# Patient Record
Sex: MALE | Race: WHITE | NOT HISPANIC OR LATINO | URBAN - METROPOLITAN AREA
[De-identification: names, ages, dates, MRNs, and addresses within clinical notes are randomized per-mention and may not be internally consistent; named-entity substitution may affect disease eponyms.]

---

## 2017-09-06 ENCOUNTER — OFFICE VISIT (OUTPATIENT)
Dept: MEDICAL GROUP | Facility: PHYSICIAN GROUP | Age: 47
End: 2017-09-06
Payer: OTHER GOVERNMENT

## 2017-09-06 VITALS
TEMPERATURE: 97.6 F | WEIGHT: 175 LBS | HEART RATE: 69 BPM | SYSTOLIC BLOOD PRESSURE: 100 MMHG | OXYGEN SATURATION: 96 % | RESPIRATION RATE: 16 BRPM | HEIGHT: 72 IN | DIASTOLIC BLOOD PRESSURE: 60 MMHG | BODY MASS INDEX: 23.7 KG/M2

## 2017-09-06 DIAGNOSIS — S46.019A ROTATOR CUFF STRAIN, UNSPECIFIED LATERALITY, INITIAL ENCOUNTER: ICD-10-CM

## 2017-09-06 DIAGNOSIS — Z87.898 HISTORY OF SNORING: ICD-10-CM

## 2017-09-06 PROCEDURE — 99214 OFFICE O/P EST MOD 30 MIN: CPT | Performed by: FAMILY MEDICINE

## 2017-09-06 RX ORDER — DOXYCYCLINE HYCLATE 50 MG/1
TABLET, DELAYED RELEASE ORAL
COMMUNITY

## 2017-09-06 RX ORDER — ETODOLAC 200 MG/1
200 CAPSULE ORAL 2 TIMES DAILY
Qty: 60 CAP | Refills: 3 | Status: SHIPPED | OUTPATIENT
Start: 2017-09-06

## 2017-09-06 ASSESSMENT — PATIENT HEALTH QUESTIONNAIRE - PHQ9: CLINICAL INTERPRETATION OF PHQ2 SCORE: 0

## 2017-09-06 ASSESSMENT — ENCOUNTER SYMPTOMS
CHILLS: 0
ARTHRALGIAS: 1
NECK PAIN: 0
CONSTITUTIONAL NEGATIVE: 1
PALPITATIONS: 0
GASTROINTESTINAL NEGATIVE: 1
COUGH: 0
EYES NEGATIVE: 1
CONSTIPATION: 0
HEADACHES: 0
CARDIOVASCULAR NEGATIVE: 1
FEVER: 0
PSYCHIATRIC NEGATIVE: 1
NEUROLOGICAL NEGATIVE: 1
HEMOPTYSIS: 0
DIZZINESS: 0
MYALGIAS: 0
RESPIRATORY NEGATIVE: 1

## 2017-09-06 NOTE — PROGRESS NOTES
Subjective:      Collin Benavidez is a 47 y.o. male who presents with Establish Care and Shoulder Pain            Bilateral shoulder pain since doing some pushups around a month ago. Pain is worse in the left > right  There are no diagnoses linked to this encounter.  No past medical history on file.  Past Surgical History:  No date: TESTICLE EXPLORATION      Comment: failure to decend  No date: TONSILLECTOMY  No date: TYMPANOPLASTY  No date: VASECTOMY  Smoking status: Former Smoker                                                              Packs/day: 0.00      Years: 0.00      Smokeless tobacco: Never Used                      Comment: quit mid 2015  Alcohol use: No               Comment: q 3 weeks    History reviewed.  No pertinent family history.      Current Outpatient Prescriptions: •  Doxycycline Hyclate 50 MG Tablet Delayed Response, Take  by mouth., Disp: , Rfl: •  tadalafil (CIALIS) 20 MG tablet, Take 1 Tab by mouth as needed for Erectile Dysfunction. (Patient not taking: Reported on 9/6/2017), Disp: 10 Tab, Rfl: 3        Shoulder Pain   This is a new problem. The current episode started more than 1 month ago. The problem occurs intermittently. The problem has been unchanged. Associated symptoms include arthralgias. Pertinent negatives include no chest pain, chills, coughing, fever, headaches, myalgias, neck pain or rash. The symptoms are aggravated by exertion. He has tried rest for the symptoms. The treatment provided mild relief.       Review of Systems   Constitutional: Negative.  Negative for chills and fever.        No past medical history on file.  Past Surgical History:  No date: TESTICLE EXPLORATION      Comment: failure to decend  No date: TONSILLECTOMY  No date: TYMPANOPLASTY  No date: VASECTOMY  Smoking status: Former Smoker                                                              Packs/day: 0.00      Years: 0.00      Smokeless tobacco: Never Used                      Comment: quit mid  2015  Alcohol use: No               Comment: q 3 weeks    History reviewed.  No pertinent family history.     HENT: Negative.    Eyes: Negative.    Respiratory: Negative.  Negative for cough and hemoptysis.    Cardiovascular: Negative.  Negative for chest pain and palpitations.   Gastrointestinal: Negative.  Negative for constipation.   Genitourinary: Negative.  Negative for dysuria and urgency.   Musculoskeletal: Positive for arthralgias and joint pain. Negative for myalgias and neck pain.   Skin: Negative.  Negative for rash.   Neurological: Negative.  Negative for dizziness and headaches.   Endo/Heme/Allergies: Negative.    Psychiatric/Behavioral: Negative.  Negative for suicidal ideas.          Objective:     /60   Pulse 69   Temp 36.4 °C (97.6 °F)   Resp 16   Ht 1.829 m (6')   Wt 79.4 kg (175 lb)   SpO2 96%   BMI 23.73 kg/m²      Physical Exam   Constitutional: He is oriented to person, place, and time. He appears well-developed and well-nourished. No distress.   HENT:   Head: Normocephalic and atraumatic.   Mouth/Throat: Oropharynx is clear and moist. No oropharyngeal exudate.   Eyes: Pupils are equal, round, and reactive to light.   Cardiovascular: Normal rate, regular rhythm, normal heart sounds and intact distal pulses.  Exam reveals no gallop and no friction rub.    No murmur heard.  Pulmonary/Chest: Effort normal and breath sounds normal. No respiratory distress. He has no wheezes. He has no rales. He exhibits no tenderness.   Musculoskeletal:        Right shoulder: He exhibits decreased range of motion and pain.        Left shoulder: He exhibits decreased range of motion, tenderness and pain.   Only with 90 degrees of flexion and abduction in both shoulders with tenderness along the supraspinatus insertion in both sides   Neurological: He is alert and oriented to person, place, and time.   Skin: He is not diaphoretic.   Psychiatric: He has a normal mood and affect. His behavior is normal.  Judgment and thought content normal.   Nursing note and vitals reviewed.              Assessment/Plan:     There are no diagnoses linked to this encounter.    Bilateral rotator cuff strain will treat with pt and nsaids

## 2017-10-19 ENCOUNTER — OFFICE VISIT (OUTPATIENT)
Dept: MEDICAL GROUP | Facility: PHYSICIAN GROUP | Age: 47
End: 2017-10-19
Payer: OTHER GOVERNMENT

## 2017-10-19 VITALS
SYSTOLIC BLOOD PRESSURE: 100 MMHG | OXYGEN SATURATION: 95 % | WEIGHT: 195 LBS | DIASTOLIC BLOOD PRESSURE: 60 MMHG | HEIGHT: 72 IN | HEART RATE: 73 BPM | BODY MASS INDEX: 26.41 KG/M2 | RESPIRATION RATE: 14 BRPM | TEMPERATURE: 97.9 F

## 2017-10-19 DIAGNOSIS — S46.019A STRAIN OF ROTATOR CUFF CAPSULE, UNSPECIFIED LATERALITY, INITIAL ENCOUNTER: ICD-10-CM

## 2017-10-19 DIAGNOSIS — M51.26 HNP (HERNIATED NUCLEUS PULPOSUS), LUMBAR: ICD-10-CM

## 2017-10-19 PROCEDURE — 99214 OFFICE O/P EST MOD 30 MIN: CPT | Performed by: FAMILY MEDICINE

## 2017-10-19 ASSESSMENT — ENCOUNTER SYMPTOMS
MYALGIAS: 1
BACK PAIN: 1
ABDOMINAL PAIN: 0

## 2017-10-19 NOTE — PROGRESS NOTES
Subjective:      Collin Benavidez is a 47 y.o. male who presents with Shoulder Pain            Had a fall and hit back MRI with L5 hnp    Did pt for both shoulder and minimally better but not fully improved, PT is advising that he gets MRI's of both shoulders    There are no diagnoses linked to this encounter.  No past medical history on file.  Past Surgical History:  No date: TESTICLE EXPLORATION      Comment: failure to decend  No date: TONSILLECTOMY  No date: TYMPANOPLASTY  No date: VASECTOMY  Smoking status: Former Smoker                                                              Packs/day: 0.00      Years: 0.00      Smokeless tobacco: Never Used                      Comment: quit mid 2015  Alcohol use: No               Comment: q 3 weeks    History reviewed.  No pertinent family history.      Current Outpatient Prescriptions: •  Doxycycline Hyclate 50 MG Tablet Delayed Response, Take  by mouth., Disp: , Rfl: •  etodolac (LODINE) 200 MG Cap, Take 1 Cap by mouth 2 times a day., Disp: 60 Cap, Rfl: 3•  tadalafil (CIALIS) 20 MG tablet, Take 1 Tab by mouth as needed for Erectile Dysfunction. (Patient not taking: Reported on 9/6/2017), Disp: 10 Tab, Rfl: 3    Patient was instructed on the use of medications, either prescriptions or OTC and informed on when the appropriate follow up time period should be. In addition, patient was also instructed that should any acute worsening occur that they should notify this clinic asap or call 911.          Back Pain   This is a new problem. The current episode started 1 to 4 weeks ago. The problem occurs constantly. The problem is unchanged. The pain is present in the lumbar spine. The quality of the pain is described as aching. The pain radiates to the right foot and left foot. The pain is moderate. The pain is worse during the day. The symptoms are aggravated by bending. Stiffness is present in the morning. Pertinent negatives include no abdominal pain or bladder incontinence.  Risk factors include recent trauma (fell and hit back and MRI with L5 hnp). He has tried NSAIDs for the symptoms. The treatment provided mild relief.       Review of Systems   Gastrointestinal: Negative for abdominal pain.   Genitourinary: Negative for bladder incontinence.   Musculoskeletal: Positive for back pain, joint pain and myalgias.          Objective:     /60   Pulse 73   Temp 36.6 °C (97.9 °F)   Resp 14   Ht 1.829 m (6')   Wt 88.5 kg (195 lb)   SpO2 95%   BMI 26.45 kg/m²      Physical Exam   Constitutional: He is oriented to person, place, and time. He appears well-developed and well-nourished. No distress.   HENT:   Head: Normocephalic and atraumatic.   Mouth/Throat: Oropharynx is clear and moist. No oropharyngeal exudate.   Eyes: Pupils are equal, round, and reactive to light.   Cardiovascular: Normal rate, regular rhythm, normal heart sounds and intact distal pulses.  Exam reveals no gallop and no friction rub.    No murmur heard.  Pulmonary/Chest: Effort normal and breath sounds normal. No respiratory distress. He has no wheezes. He has no rales. He exhibits no tenderness.   Musculoskeletal:        Right shoulder: He exhibits decreased range of motion and pain.        Left shoulder: He exhibits decreased range of motion and pain.        Lumbar back: He exhibits decreased range of motion and pain.   Neurological: He is alert and oriented to person, place, and time.   Skin: He is not diaphoretic.   Psychiatric: He has a normal mood and affect. His behavior is normal. Judgment and thought content normal.   Nursing note and vitals reviewed.              Assessment/Plan:     There are no diagnoses linked to this encounter.  Continued rotator cuff issues will get MRI of both shoulders and has f/u set up with ortho already for his hnp in the back

## 2017-11-03 ENCOUNTER — APPOINTMENT (OUTPATIENT)
Dept: RADIOLOGY | Facility: MEDICAL CENTER | Age: 47
End: 2017-11-03
Attending: FAMILY MEDICINE
Payer: COMMERCIAL

## 2017-11-03 DIAGNOSIS — S46.019A STRAIN OF ROTATOR CUFF CAPSULE, UNSPECIFIED LATERALITY, INITIAL ENCOUNTER: ICD-10-CM

## 2017-11-03 PROCEDURE — 73221 MRI JOINT UPR EXTREM W/O DYE: CPT | Mod: RT

## 2017-11-03 PROCEDURE — 73221 MRI JOINT UPR EXTREM W/O DYE: CPT | Mod: LT

## 2017-12-05 ENCOUNTER — OFFICE VISIT (OUTPATIENT)
Dept: MEDICAL GROUP | Facility: PHYSICIAN GROUP | Age: 47
End: 2017-12-05
Payer: OTHER GOVERNMENT

## 2017-12-05 VITALS
WEIGHT: 200 LBS | HEIGHT: 72 IN | BODY MASS INDEX: 27.09 KG/M2 | DIASTOLIC BLOOD PRESSURE: 60 MMHG | SYSTOLIC BLOOD PRESSURE: 92 MMHG | TEMPERATURE: 97.9 F | RESPIRATION RATE: 14 BRPM | HEART RATE: 70 BPM | OXYGEN SATURATION: 95 %

## 2017-12-05 DIAGNOSIS — J34.2 DEVIATED SEPTUM: ICD-10-CM

## 2017-12-05 DIAGNOSIS — S46.019A STRAIN OF ROTATOR CUFF CAPSULE, UNSPECIFIED LATERALITY, INITIAL ENCOUNTER: ICD-10-CM

## 2017-12-05 DIAGNOSIS — G47.34 IDIOPATHIC SLEEP RELATED NONOBSTRUCTIVE ALVEOLAR HYPOVENTILATION: ICD-10-CM

## 2017-12-05 PROCEDURE — 99214 OFFICE O/P EST MOD 30 MIN: CPT | Performed by: FAMILY MEDICINE

## 2017-12-05 ASSESSMENT — ENCOUNTER SYMPTOMS
PSYCHIATRIC NEGATIVE: 1
GASTROINTESTINAL NEGATIVE: 1
RESPIRATORY NEGATIVE: 1
NEUROLOGICAL NEGATIVE: 1
MUSCULOSKELETAL NEGATIVE: 1
CONSTIPATION: 0
CHILLS: 0
FEVER: 0
CONSTITUTIONAL NEGATIVE: 1
COUGH: 0
EYES NEGATIVE: 1
PALPITATIONS: 0
CARDIOVASCULAR NEGATIVE: 1
HEMOPTYSIS: 0
HEADACHES: 0
MYALGIAS: 0
DIZZINESS: 0
NECK PAIN: 0

## 2017-12-05 NOTE — PROGRESS NOTES
Subjective:      Collin Benavidez is a 47 y.o. male who presents with Pain            1. Strain of rotator cuff capsule, unspecified laterality, initial encounter   MRI of both shoulder with some downsloping of the ac hook but intact musculature, advised on RICE    2. Idiopathic sleep related nonobstructive alveolar hypoventilation  Sleep study with sleep apnea, has had tonsils out and discussed with ent other option including UPPP but will hold off on that now and try dental device and maybe cipap    3. Deviated septum  Will f/u with ent for possible surgery vs. Nose sprays    History reviewed. No pertinent past medical history.  Past Surgical History:  No date: TESTICLE EXPLORATION      Comment: failure to decend  No date: TONSILLECTOMY  No date: TYMPANOPLASTY  No date: VASECTOMY  Smoking status: Former Smoker                                                              Packs/day: 0.00      Years: 0.00      Smokeless tobacco: Never Used                      Comment: quit mid 2015  Alcohol use: No               Comment: q 3 weeks    History reviewed.  No pertinent family history.      Current Outpatient Prescriptions: •  Doxycycline Hyclate 50 MG Tablet Delayed Response, Take  by mouth., Disp: , Rfl: •  etodolac (LODINE) 200 MG Cap, Take 1 Cap by mouth 2 times a day., Disp: 60 Cap, Rfl: 3•  tadalafil (CIALIS) 20 MG tablet, Take 1 Tab by mouth as needed for Erectile Dysfunction. (Patient not taking: Reported on 9/6/2017), Disp: 10 Tab, Rfl: 3    Patient was instructed on the use of medications, either prescriptions or OTC and informed on when the appropriate follow up time period should be. In addition, patient was also instructed that should any acute worsening occur that they should notify this clinic asap or call 911.            Review of Systems   Constitutional: Negative.  Negative for chills and fever.        History reviewed. No pertinent past medical history.  Past Surgical History:  No date: TESTICLE  EXPLORATION      Comment: failure to decend  No date: TONSILLECTOMY  No date: TYMPANOPLASTY  No date: VASECTOMY  Smoking status: Former Smoker                                                              Packs/day: 0.00      Years: 0.00      Smokeless tobacco: Never Used                      Comment: quit mid 2015  Alcohol use: No               Comment: q 3 weeks    History reviewed.  No pertinent family history.     HENT: Negative.    Eyes: Negative.    Respiratory: Negative.  Negative for cough and hemoptysis.    Cardiovascular: Negative.  Negative for chest pain and palpitations.   Gastrointestinal: Negative.  Negative for constipation.   Genitourinary: Negative.  Negative for dysuria and urgency.   Musculoskeletal: Negative.  Negative for myalgias and neck pain.   Skin: Negative.  Negative for rash.   Neurological: Negative.  Negative for dizziness and headaches.   Endo/Heme/Allergies: Negative.    Psychiatric/Behavioral: Negative.  Negative for suicidal ideas.          Objective:     BP (!) 92/60   Pulse 70   Temp 36.6 °C (97.9 °F)   Resp 14   Ht 1.829 m (6')   Wt 90.7 kg (200 lb)   SpO2 95%   BMI 27.12 kg/m²      Physical Exam   Constitutional: He is oriented to person, place, and time. He appears well-developed and well-nourished. No distress.   HENT:   Head: Normocephalic and atraumatic.   Mouth/Throat: Oropharynx is clear and moist. No oropharyngeal exudate.   Eyes: Pupils are equal, round, and reactive to light.   Cardiovascular: Normal rate, regular rhythm, normal heart sounds and intact distal pulses.  Exam reveals no gallop and no friction rub.    No murmur heard.  Pulmonary/Chest: Effort normal and breath sounds normal. No respiratory distress. He has no wheezes. He has no rales. He exhibits no tenderness.   Neurological: He is alert and oriented to person, place, and time.   Skin: He is not diaphoretic.   Psychiatric: He has a normal mood and affect. His behavior is normal. Judgment and thought  content normal.   Nursing note and vitals reviewed.              Assessment/Plan:     1. Strain of rotator cuff capsule, unspecified laterality, initial encounter      2. Idiopathic sleep related nonobstructive alveolar hypoventilation      3. Deviated septum

## 2018-01-23 ENCOUNTER — OFFICE VISIT (OUTPATIENT)
Dept: MEDICAL GROUP | Facility: PHYSICIAN GROUP | Age: 48
End: 2018-01-23
Payer: OTHER GOVERNMENT

## 2018-01-23 VITALS
HEIGHT: 72 IN | WEIGHT: 196 LBS | SYSTOLIC BLOOD PRESSURE: 120 MMHG | DIASTOLIC BLOOD PRESSURE: 60 MMHG | OXYGEN SATURATION: 93 % | RESPIRATION RATE: 14 BRPM | BODY MASS INDEX: 26.55 KG/M2 | TEMPERATURE: 98 F | HEART RATE: 93 BPM

## 2018-01-23 DIAGNOSIS — L30.8 OTHER ECZEMA: ICD-10-CM

## 2018-01-23 DIAGNOSIS — G47.30 SLEEP APNEA, UNSPECIFIED TYPE: ICD-10-CM

## 2018-01-23 DIAGNOSIS — S46.019D ROTATOR CUFF STRAIN, UNSPECIFIED LATERALITY, SUBSEQUENT ENCOUNTER: ICD-10-CM

## 2018-01-23 DIAGNOSIS — M47.812 OSTEOARTHRITIS OF CERVICAL SPINE, UNSPECIFIED SPINAL OSTEOARTHRITIS COMPLICATION STATUS: ICD-10-CM

## 2018-01-23 DIAGNOSIS — M54.16 LUMBAR RADICULOPATHY: ICD-10-CM

## 2018-01-23 PROBLEM — Z87.898 HISTORY OF SNORING: Status: RESOLVED | Noted: 2017-09-06 | Resolved: 2018-01-23

## 2018-01-23 PROCEDURE — 99213 OFFICE O/P EST LOW 20 MIN: CPT | Performed by: FAMILY MEDICINE

## 2018-01-24 NOTE — PROGRESS NOTES
Over 50% of this 15 minute visit was spent on counseling and coordination of care regarding the patient's current problem of       Form filled out for  service for the following diagnoses  1. Lumbar radiculopathy  Work restrictions as listed on form in media    2. Sleep apnea, unspecified type  Seen by pulmonology  Severe in nature  Will start on bipap    3. Rotator cuff strain, unspecified laterality, subsequent encounter  Pain in both shoulders, MRIs with no structural tears but still with some residual pain    4. Osteoarthritis of cervical spine, unspecified spinal osteoarthritis complication status  Seen on MRI  Pain with use at work    5. Other eczema  Has done 2 steroid shots per year in the past for this which covers face and body around 40% of body  Started in 2006 after exposure to burn pits and hydrocarbon fuels during work as a       History reviewed. No pertinent past medical history.  Past Surgical History:   Procedure Laterality Date   • TESTICLE EXPLORATION      failure to decend   • TONSILLECTOMY     • TYMPANOPLASTY     • VASECTOMY       Social History   Substance Use Topics   • Smoking status: Former Smoker   • Smokeless tobacco: Never Used      Comment: quit mid 2015   • Alcohol use No      Comment: q 3 weeks     History reviewed. No pertinent family history.      Current Outpatient Prescriptions:   •  Doxycycline Hyclate 50 MG Tablet Delayed Response, Take  by mouth., Disp: , Rfl:   •  etodolac (LODINE) 200 MG Cap, Take 1 Cap by mouth 2 times a day., Disp: 60 Cap, Rfl: 3  •  tadalafil (CIALIS) 20 MG tablet, Take 1 Tab by mouth as needed for Erectile Dysfunction., Disp: 10 Tab, Rfl: 3    Patient was instructed on the use of medications, either prescriptions or OTC and informed on when the appropriate follow up time period should be. In addition, patient was also instructed that should any acute worsening occur that they should notify this clinic asap or call 911.

## 2018-02-02 ENCOUNTER — PATIENT MESSAGE (OUTPATIENT)
Dept: MEDICAL GROUP | Facility: PHYSICIAN GROUP | Age: 48
End: 2018-02-02

## 2018-02-02 ENCOUNTER — TELEPHONE (OUTPATIENT)
Dept: MEDICAL GROUP | Facility: PHYSICIAN GROUP | Age: 48
End: 2018-02-02

## 2018-02-05 NOTE — TELEPHONE ENCOUNTER
Letter made and pt called. Advised of letter, pt stated he would come in today. Pt is also wanting to keep appt to discuss other issues with the doctor.

## 2018-02-08 ENCOUNTER — OFFICE VISIT (OUTPATIENT)
Dept: MEDICAL GROUP | Facility: PHYSICIAN GROUP | Age: 48
End: 2018-02-08
Payer: COMMERCIAL

## 2018-02-08 VITALS
HEIGHT: 72 IN | BODY MASS INDEX: 26.68 KG/M2 | DIASTOLIC BLOOD PRESSURE: 88 MMHG | TEMPERATURE: 97.5 F | HEART RATE: 93 BPM | SYSTOLIC BLOOD PRESSURE: 126 MMHG | WEIGHT: 197 LBS | OXYGEN SATURATION: 95 %

## 2018-02-08 DIAGNOSIS — L30.9 ECZEMA, UNSPECIFIED TYPE: ICD-10-CM

## 2018-02-08 PROCEDURE — 99214 OFFICE O/P EST MOD 30 MIN: CPT | Performed by: FAMILY MEDICINE

## 2018-02-08 ASSESSMENT — ENCOUNTER SYMPTOMS
NECK PAIN: 0
MYALGIAS: 0
CONSTITUTIONAL NEGATIVE: 1
RESPIRATORY NEGATIVE: 1
COUGH: 0
CARDIOVASCULAR NEGATIVE: 1
CONSTIPATION: 0
HEADACHES: 0
PSYCHIATRIC NEGATIVE: 1
EYES NEGATIVE: 1
HEMOPTYSIS: 0
GASTROINTESTINAL NEGATIVE: 1
PALPITATIONS: 0
FEVER: 0
DIZZINESS: 0
CHILLS: 0
NEUROLOGICAL NEGATIVE: 1

## 2018-02-08 NOTE — PROGRESS NOTES
Subjective:      Collin Benavidez is a 47 y.o. male who presents with Arthritis (Needs Cortisone shot )            There are no diagnoses linked to this encounter.  No past medical history on file.  Past Surgical History:  No date: TESTICLE EXPLORATION      Comment: failure to decend  No date: TONSILLECTOMY  No date: TYMPANOPLASTY  No date: VASECTOMY  Smoking status: Former Smoker                                                              Packs/day: 0.00      Years: 0.00      Smokeless tobacco: Never Used                      Comment: quit mid 2015  Alcohol use: No               Comment: q 3 weeks    No family history on file.      Current Outpatient Prescriptions: •  Doxycycline Hyclate 50 MG Tablet Delayed Response, Take  by mouth., Disp: , Rfl: •  etodolac (LODINE) 200 MG Cap, Take 1 Cap by mouth 2 times a day., Disp: 60 Cap, Rfl: 3•  tadalafil (CIALIS) 20 MG tablet, Take 1 Tab by mouth as needed for Erectile Dysfunction., Disp: 10 Tab, Rfl: 3    Patient was instructed on the use of medications, either prescriptions or OTC and informed on when the appropriate follow up time period should be. In addition, patient was also instructed that should any acute worsening occur that they should notify this clinic asap or call 911.          Rash   This is a recurrent problem. The current episode started more than 1 month ago. The problem has been waxing and waning since onset. The affected locations include the face. The rash is characterized by redness, scaling, peeling and itchiness. Associated symptoms include joint pain. Pertinent negatives include no cough or fever.       Review of Systems   Constitutional: Negative.  Negative for chills and fever.        No past medical history on file.  Past Surgical History:  No date: TESTICLE EXPLORATION      Comment: failure to decend  No date: TONSILLECTOMY  No date: TYMPANOPLASTY  No date: VASECTOMY  Smoking status: Former Smoker                                                               Packs/day: 0.00      Years: 0.00      Smokeless tobacco: Never Used                      Comment: quit mid 2015  Alcohol use: No               Comment: q 3 weeks    No family history on file.     HENT: Negative.    Eyes: Negative.    Respiratory: Negative.  Negative for cough and hemoptysis.    Cardiovascular: Negative.  Negative for chest pain and palpitations.   Gastrointestinal: Negative.  Negative for constipation.   Genitourinary: Negative.  Negative for dysuria and urgency.   Musculoskeletal: Positive for joint pain. Negative for myalgias and neck pain.   Skin: Positive for rash.   Neurological: Negative.  Negative for dizziness and headaches.   Endo/Heme/Allergies: Negative.    Psychiatric/Behavioral: Negative.  Negative for suicidal ideas.          Objective:     /88   Pulse 93   Temp 36.4 °C (97.5 °F)   Ht 1.829 m (6')   Wt 89.4 kg (197 lb)   SpO2 95%   BMI 26.72 kg/m²      Physical Exam   Constitutional: He is oriented to person, place, and time. He appears well-developed and well-nourished. No distress.   HENT:   Head: Normocephalic and atraumatic.   Right Ear: External ear normal.   Left Ear: External ear normal.   Nose: Nose normal.   Mouth/Throat: Oropharynx is clear and moist. No oropharyngeal exudate.   Eyes: Pupils are equal, round, and reactive to light. Right eye exhibits no discharge. Left eye exhibits no discharge. No scleral icterus.   Neck: Normal range of motion. Neck supple. No JVD present. No tracheal deviation present. No thyromegaly present.   Cardiovascular: Normal rate, regular rhythm, normal heart sounds and intact distal pulses.  Exam reveals no gallop and no friction rub.    No murmur heard.  Pulmonary/Chest: Effort normal and breath sounds normal. No stridor. No respiratory distress. He has no wheezes. He has no rales. He exhibits no tenderness.   Abdominal: Soft. He exhibits no distension. There is no tenderness.   Lymphadenopathy:     He has no cervical  adenopathy.   Neurological: He is alert and oriented to person, place, and time. No cranial nerve deficit.   Skin: Rash noted. He is not diaphoretic.        Psychiatric: He has a normal mood and affect. His behavior is normal. Judgment and thought content normal.   Nursing note and vitals reviewed.              Assessment/Plan:     Chronic eczema on the face, has improved with kenalog shots im  We are out today of this, med called into pharmacy and patient brought in vial and given here in clinic today

## 2018-02-14 RX ORDER — TRIAMCINOLONE ACETONIDE 40 MG/ML
40 INJECTION, SUSPENSION INTRA-ARTICULAR; INTRAMUSCULAR ONCE
OUTPATIENT
Start: 2018-02-14 | End: 2018-02-15

## 2018-02-14 NOTE — PROGRESS NOTES
Collin Benavidez is a 47 y.o. male here for a non-provider visit for Kenalog injection.    Reason for injection:   Order in MAR?: Yes  Patient supplied?:Yes  Minimum interval has been met for this injection (per MAR order): Yes    Order and dose verified by: thien  Patient tolerated injection and no adverse effects were observed or reported: Yes    # of Administrations remaining in MAR: 0

## 2018-05-29 ENCOUNTER — OFFICE VISIT (OUTPATIENT)
Dept: MEDICAL GROUP | Facility: PHYSICIAN GROUP | Age: 48
End: 2018-05-29
Payer: OTHER GOVERNMENT

## 2018-05-29 VITALS
DIASTOLIC BLOOD PRESSURE: 60 MMHG | OXYGEN SATURATION: 96 % | WEIGHT: 209 LBS | RESPIRATION RATE: 14 BRPM | BODY MASS INDEX: 28.31 KG/M2 | TEMPERATURE: 97.9 F | HEART RATE: 86 BPM | HEIGHT: 72 IN | SYSTOLIC BLOOD PRESSURE: 122 MMHG

## 2018-05-29 DIAGNOSIS — M54.12 CERVICAL RADICULOPATHY: ICD-10-CM

## 2018-05-29 PROCEDURE — 99214 OFFICE O/P EST MOD 30 MIN: CPT | Performed by: FAMILY MEDICINE

## 2018-05-29 RX ORDER — TIZANIDINE 4 MG/1
4 TABLET ORAL EVERY 6 HOURS PRN
Qty: 30 TAB | Refills: 3 | Status: SHIPPED | OUTPATIENT
Start: 2018-05-29

## 2018-05-29 ASSESSMENT — ENCOUNTER SYMPTOMS
MYALGIAS: 0
COUGH: 0
RESPIRATORY NEGATIVE: 1
HEMOPTYSIS: 0
NECK PAIN: 1
HEADACHES: 0
CONSTITUTIONAL NEGATIVE: 1
CONSTIPATION: 0
NEUROLOGICAL NEGATIVE: 1
DIZZINESS: 0
PALPITATIONS: 0
GASTROINTESTINAL NEGATIVE: 1
CARDIOVASCULAR NEGATIVE: 1
EYES NEGATIVE: 1
CHILLS: 0
FEVER: 0
PSYCHIATRIC NEGATIVE: 1

## 2018-05-30 NOTE — PROGRESS NOTES
Subjective:      Collin Benavidez is a 47 y.o. male who presents with Back Pain and Neck Pain            Pain in the neck area, with worsening on movement to the left associated with some tingling at times down the left arm  Has a mri with evidence of cervical root dysfunction from past fall  Would like to see PT  1. Cervical radiculopathy    - REFERRAL TO PHYSICAL THERAPY Reason for Therapy: Eval/Treat/Report  - tizanidine (ZANAFLEX) 4 MG Tab; Take 1 Tab by mouth every 6 hours as needed.  Dispense: 30 Tab; Refill: 3    History reviewed. No pertinent past medical history.  Past Surgical History:  No date: TESTICLE EXPLORATION      Comment: failure to decend  No date: TONSILLECTOMY  No date: TYMPANOPLASTY  No date: VASECTOMY  Smoking status: Former Smoker                                                              Packs/day: 0.00      Years: 0.00      Smokeless tobacco: Never Used                      Comment: quit mid 2015  Alcohol use: No               Comment: q 3 weeks    History reviewed.  No pertinent family history.      Current Outpatient Prescriptions: •  tizanidine (ZANAFLEX) 4 MG Tab, Take 1 Tab by mouth every 6 hours as needed., Disp: 30 Tab, Rfl: 3•  Doxycycline Hyclate 50 MG Tablet Delayed Response, Take  by mouth., Disp: , Rfl: •  etodolac (LODINE) 200 MG Cap, Take 1 Cap by mouth 2 times a day., Disp: 60 Cap, Rfl: 3•  tadalafil (CIALIS) 20 MG tablet, Take 1 Tab by mouth as needed for Erectile Dysfunction., Disp: 10 Tab, Rfl: 3    Patient was instructed on the use of medications, either prescriptions or OTC and informed on when the appropriate follow up time period should be. In addition, patient was also instructed that should any acute worsening occur that they should notify this clinic asap or call 911.          Neck Pain    This is a chronic problem. The current episode started more than 1 year ago. The problem occurs intermittently. The problem has been gradually worsening. The pain is associated  with nothing. The pain is present in the left side. The quality of the pain is described as aching. The pain is mild. Pertinent negatives include no chest pain, fever or headaches. He has tried acetaminophen for the symptoms. The treatment provided mild relief.       Review of Systems   Constitutional: Negative.  Negative for chills and fever.        History reviewed. No pertinent past medical history.  Past Surgical History:  No date: TESTICLE EXPLORATION      Comment: failure to decend  No date: TONSILLECTOMY  No date: TYMPANOPLASTY  No date: VASECTOMY  Smoking status: Former Smoker                                                              Packs/day: 0.00      Years: 0.00      Smokeless tobacco: Never Used                      Comment: quit mid 2015  Alcohol use: No               Comment: q 3 weeks    History reviewed.  No pertinent family history.     HENT: Negative.    Eyes: Negative.    Respiratory: Negative.  Negative for cough and hemoptysis.    Cardiovascular: Negative.  Negative for chest pain and palpitations.   Gastrointestinal: Negative.  Negative for constipation.   Genitourinary: Negative.  Negative for dysuria and urgency.   Musculoskeletal: Positive for neck pain. Negative for myalgias.   Skin: Negative.  Negative for rash.   Neurological: Negative.  Negative for dizziness and headaches.   Endo/Heme/Allergies: Negative.    Psychiatric/Behavioral: Negative.  Negative for suicidal ideas.          Objective:     /60   Pulse 86   Temp 36.6 °C (97.9 °F)   Resp 14   Ht 1.829 m (6')   Wt 94.8 kg (209 lb)   SpO2 96%   BMI 28.35 kg/m²      Physical Exam   Constitutional: He is oriented to person, place, and time. He appears well-developed and well-nourished. No distress.   HENT:   Head: Normocephalic and atraumatic.   Mouth/Throat: Oropharynx is clear and moist. No oropharyngeal exudate.   Eyes: Pupils are equal, round, and reactive to light.   Cardiovascular: Normal rate, regular rhythm,  normal heart sounds and intact distal pulses.  Exam reveals no gallop and no friction rub.    No murmur heard.  Pulmonary/Chest: Effort normal and breath sounds normal. No respiratory distress. He has no wheezes. He has no rales. He exhibits no tenderness.   Musculoskeletal:        Cervical back: He exhibits decreased range of motion, tenderness and pain.   Neurological: He is alert and oriented to person, place, and time.   Skin: He is not diaphoretic.   Psychiatric: He has a normal mood and affect. His behavior is normal. Judgment and thought content normal.   Nursing note and vitals reviewed.              Assessment/Plan:     1. Cervical radiculopathy    - REFERRAL TO PHYSICAL THERAPY Reason for Therapy: Eval/Treat/Report  - tizanidine (ZANAFLEX) 4 MG Tab; Take 1 Tab by mouth every 6 hours as needed.  Dispense: 30 Tab; Refill: 3

## 2018-06-27 ENCOUNTER — OFFICE VISIT (OUTPATIENT)
Dept: MEDICAL GROUP | Facility: PHYSICIAN GROUP | Age: 48
End: 2018-06-27
Payer: COMMERCIAL

## 2018-06-27 VITALS
OXYGEN SATURATION: 97 % | HEART RATE: 86 BPM | HEIGHT: 72 IN | RESPIRATION RATE: 18 BRPM | SYSTOLIC BLOOD PRESSURE: 110 MMHG | WEIGHT: 205.96 LBS | BODY MASS INDEX: 27.9 KG/M2 | DIASTOLIC BLOOD PRESSURE: 72 MMHG | TEMPERATURE: 97.4 F

## 2018-06-27 DIAGNOSIS — M25.521 ELBOW PAIN, RIGHT: ICD-10-CM

## 2018-06-27 PROCEDURE — 99214 OFFICE O/P EST MOD 30 MIN: CPT | Performed by: FAMILY MEDICINE

## 2018-06-27 ASSESSMENT — ENCOUNTER SYMPTOMS
COUGH: 0
TINGLING: 0
PALPITATIONS: 0
MYALGIAS: 0
HEADACHES: 0
CARDIOVASCULAR NEGATIVE: 1
EYES NEGATIVE: 1
BRUISES/BLEEDS EASILY: 0
PSYCHIATRIC NEGATIVE: 1
DIZZINESS: 0
NAUSEA: 0
DEPRESSION: 0
GASTROINTESTINAL NEGATIVE: 1
HEARTBURN: 0
HEMOPTYSIS: 0
DOUBLE VISION: 0
RESPIRATORY NEGATIVE: 1
BLURRED VISION: 0
CHILLS: 0
CONSTITUTIONAL NEGATIVE: 1
FEVER: 0
NEUROLOGICAL NEGATIVE: 1

## 2018-06-27 NOTE — PROGRESS NOTES
Subjective:      Collin Benavidez is a 47 y.o. male who presents with Elbow Injury (Right )            1. Elbow pain, right  FOOSH injury to right elbow with fall 3 weeks ago, still with some pain in the medial elbow area with supination and flexion. Good ROM and strength  Will get xray to r/o radial head fracture and use elbow sleeve for support  - DX-ELBOW-LIMITED 2- RIGHT; Future  - NON SPECIFIED; Right elbow sleeve  Dispense: 1 Each; Refill: 0    History reviewed. No pertinent past medical history.  Past Surgical History:  No date: TESTICLE EXPLORATION      Comment: failure to decend  No date: TONSILLECTOMY  No date: TYMPANOPLASTY  No date: VASECTOMY  Smoking status: Former Smoker                                                              Packs/day: 0.00      Years: 0.00      Smokeless tobacco: Never Used                      Comment: quit mid 2015  Alcohol use: No               Comment: q 3 weeks    History reviewed.  No pertinent family history.      Current Outpatient Prescriptions: •  NON SPECIFIED, Right elbow sleeve, Disp: 1 Each, Rfl: 0•  tizanidine (ZANAFLEX) 4 MG Tab, Take 1 Tab by mouth every 6 hours as needed., Disp: 30 Tab, Rfl: 3•  Doxycycline Hyclate 50 MG Tablet Delayed Response, Take  by mouth., Disp: , Rfl: •  etodolac (LODINE) 200 MG Cap, Take 1 Cap by mouth 2 times a day., Disp: 60 Cap, Rfl: 3•  tadalafil (CIALIS) 20 MG tablet, Take 1 Tab by mouth as needed for Erectile Dysfunction., Disp: 10 Tab, Rfl: 3    Patient was instructed on the use of medications, either prescriptions or OTC and informed on when the appropriate follow up time period should be. In addition, patient was also instructed that should any acute worsening occur that they should notify this clinic asap or call 911.            Review of Systems   Constitutional: Negative.  Negative for chills and fever.   HENT: Negative.  Negative for hearing loss.    Eyes: Negative.  Negative for blurred vision and double vision.    Respiratory: Negative.  Negative for cough and hemoptysis.    Cardiovascular: Negative.  Negative for chest pain and palpitations.   Gastrointestinal: Negative.  Negative for heartburn and nausea.   Genitourinary: Negative.  Negative for dysuria.   Musculoskeletal: Positive for joint pain. Negative for myalgias.   Skin: Negative.  Negative for rash.   Neurological: Negative.  Negative for dizziness, tingling and headaches.   Endo/Heme/Allergies: Negative.  Does not bruise/bleed easily.   Psychiatric/Behavioral: Negative.  Negative for depression and suicidal ideas.   All other systems reviewed and are negative.         Objective:     /72   Pulse 86   Temp 36.3 °C (97.4 °F)   Resp 18   Ht 1.829 m (6')   Wt 93.4 kg (205 lb 15.4 oz)   SpO2 97%   BMI 27.93 kg/m²      Physical Exam   Constitutional: He is oriented to person, place, and time. He appears well-developed and well-nourished. No distress.   HENT:   Head: Normocephalic and atraumatic.   Mouth/Throat: Oropharynx is clear and moist. No oropharyngeal exudate.   Eyes: Pupils are equal, round, and reactive to light.   Cardiovascular: Normal rate, regular rhythm, normal heart sounds and intact distal pulses.  Exam reveals no gallop and no friction rub.    No murmur heard.  Pulmonary/Chest: Effort normal and breath sounds normal. No respiratory distress. He has no wheezes. He has no rales. He exhibits no tenderness.   Musculoskeletal:        Right elbow: He exhibits normal range of motion, no swelling and no effusion. Tenderness found. Medial epicondyle tenderness noted.   Neurological: He is alert and oriented to person, place, and time.   Skin: He is not diaphoretic.   Psychiatric: He has a normal mood and affect. His behavior is normal. Judgment and thought content normal.   Nursing note and vitals reviewed.              Assessment/Plan:     1. Elbow pain, right    - DX-ELBOW-LIMITED 2- RIGHT; Future  - NON SPECIFIED; Right elbow sleeve  Dispense: 1  Each; Refill: 0